# Patient Record
Sex: FEMALE | Race: WHITE | Employment: UNEMPLOYED | URBAN - METROPOLITAN AREA
[De-identification: names, ages, dates, MRNs, and addresses within clinical notes are randomized per-mention and may not be internally consistent; named-entity substitution may affect disease eponyms.]

---

## 2017-03-25 ENCOUNTER — TELEPHONE (OUTPATIENT)
Dept: OPTOMETRY | Facility: CLINIC | Age: 32
End: 2017-03-25

## 2017-03-25 NOTE — TELEPHONE ENCOUNTER
pt called. She is in [place]. She is out of contacts. Her  can come by on Monday to pick this up.   She made an appt for

## 2017-03-25 NOTE — TELEPHONE ENCOUNTER
pt called. Miriam Maria T is in [place]. She is out of contacts. Her  can come by on Monday to pick this up. She made an appt for 4/21/17 with [facility]. Please advise. Thank you.

## 2017-03-27 ENCOUNTER — TELEPHONE (OUTPATIENT)
Dept: OPTOMETRY | Facility: CLINIC | Age: 32
End: 2017-03-27

## 2017-06-23 ENCOUNTER — TELEPHONE (OUTPATIENT)
Dept: OPTOMETRY | Facility: CLINIC | Age: 32
End: 2017-06-23

## 2018-11-29 PROBLEM — F90.0 ADHD (ATTENTION DEFICIT HYPERACTIVITY DISORDER), INATTENTIVE TYPE: Status: ACTIVE | Noted: 2018-11-29

## 2020-03-03 ENCOUNTER — HOSPITAL ENCOUNTER (OUTPATIENT)
Age: 35
Discharge: ANOTHER HOSPITAL | End: 2020-03-03
Attending: EMERGENCY MEDICINE
Payer: COMMERCIAL

## 2020-03-03 ENCOUNTER — HOSPITAL ENCOUNTER (EMERGENCY)
Facility: HOSPITAL | Age: 35
Discharge: LEFT BEFORE DISCHARGE | End: 2020-03-03
Payer: COMMERCIAL

## 2020-03-03 VITALS
OXYGEN SATURATION: 100 % | WEIGHT: 125 LBS | DIASTOLIC BLOOD PRESSURE: 88 MMHG | TEMPERATURE: 98 F | BODY MASS INDEX: 20.83 KG/M2 | RESPIRATION RATE: 20 BRPM | SYSTOLIC BLOOD PRESSURE: 123 MMHG | HEIGHT: 65 IN | HEART RATE: 101 BPM

## 2020-03-03 VITALS
SYSTOLIC BLOOD PRESSURE: 135 MMHG | RESPIRATION RATE: 22 BRPM | OXYGEN SATURATION: 100 % | TEMPERATURE: 97 F | DIASTOLIC BLOOD PRESSURE: 94 MMHG | HEART RATE: 83 BPM

## 2020-03-03 DIAGNOSIS — R07.89 CHEST PAIN, ATYPICAL: Primary | ICD-10-CM

## 2020-03-03 PROCEDURE — 99214 OFFICE O/P EST MOD 30 MIN: CPT

## 2020-03-03 PROCEDURE — 93010 ELECTROCARDIOGRAM REPORT: CPT

## 2020-03-03 PROCEDURE — 99204 OFFICE O/P NEW MOD 45 MIN: CPT

## 2020-03-03 PROCEDURE — 93005 ELECTROCARDIOGRAM TRACING: CPT

## 2020-03-03 PROCEDURE — 93010 ELECTROCARDIOGRAM REPORT: CPT | Performed by: EMERGENCY MEDICINE

## 2020-03-03 NOTE — ED INITIAL ASSESSMENT (HPI)
Pt states having tingling to all extremities and now into face and pain in rt ear and SOB. States was at a walk in clinic and told was panic attack, but states does not feel panicky. States started while playing tennis. Denies chest pain, n/v/d, fevers.

## 2020-03-03 NOTE — ED INITIAL ASSESSMENT (HPI)
C/o \"tingling\" all over body. Feeling a little lightheaded. C/o Pain to R side of neck and shoulder. C/o headache. No hx of migraines.

## 2020-03-03 NOTE — ED PROVIDER NOTES
Patient Seen in: [facility] [address] [place] [address]    History   Patient presents with:   Anxiety/Panic attack    Stated Complaint: tingling/dizziness     HPI    Patient complains of right-sided neck pain and shoulder pain that started about 45 °C)   Temp src Oral   SpO2 100 %   O2 Device None (Room air)       Current:BP (!) 135/94   Pulse 83   Temp 96.7 °F (35.9 °C) (Oral)   Resp 22   SpO2 100%    PULSE OX   GENERAL: In mild distress  HEAD: normocephalic, atraumatic,   EYES: PERRLA, EOMI, conj s